# Patient Record
Sex: FEMALE
[De-identification: names, ages, dates, MRNs, and addresses within clinical notes are randomized per-mention and may not be internally consistent; named-entity substitution may affect disease eponyms.]

---

## 2020-06-11 ENCOUNTER — DASHBOARD ENCOUNTERS (OUTPATIENT)
Age: 35
End: 2020-06-11

## 2020-06-11 ENCOUNTER — OFFICE VISIT (OUTPATIENT)
Dept: URBAN - METROPOLITAN AREA CLINIC 105 | Facility: CLINIC | Age: 35
End: 2020-06-11
Payer: COMMERCIAL

## 2020-06-11 DIAGNOSIS — K80.20 ASYMPTOMATIC CHOLELITHIASIS: ICD-10-CM

## 2020-06-11 DIAGNOSIS — K58.1 IRRITABLE BOWEL SYNDROME WITH CONSTIPATION: ICD-10-CM

## 2020-06-11 DIAGNOSIS — K21.0 GERD WITH ESOPHAGITIS: ICD-10-CM

## 2020-06-11 DIAGNOSIS — E66.01 MORBID (SEVERE) OBESITY DUE TO EXCESS CALORIES: ICD-10-CM

## 2020-06-11 DIAGNOSIS — A04.8 H. PYLORI INFECTION: ICD-10-CM

## 2020-06-11 PROBLEM — 266474003: Status: ACTIVE | Noted: 2020-06-11

## 2020-06-11 PROBLEM — 440630006: Status: ACTIVE | Noted: 2020-06-11

## 2020-06-11 PROBLEM — 721730009: Status: ACTIVE | Noted: 2020-06-11

## 2020-06-11 PROBLEM — 238136002: Status: ACTIVE | Noted: 2020-06-11

## 2020-06-11 PROCEDURE — 99214 OFFICE O/P EST MOD 30 MIN: CPT | Performed by: INTERNAL MEDICINE

## 2020-06-11 RX ORDER — DICYCLOMINE HYDROCHLORIDE 10 MG/1
TAKE 1 CAPSULE BY ORAL ROUTE 3 TIMES A DAY (BEFORE MEALS) FOR 90 DAYS CAPSULE ORAL
Qty: 270 | Refills: 3 | Status: ACTIVE | COMMUNITY
Start: 2020-05-11 | End: 2021-05-06

## 2020-06-11 RX ORDER — OMEPRAZOLE 40 MG/1
TAKE 1 CAPSULE BY ORAL ROUTE ONCE A DAY (IN THE MORNING) FOR 90 DAYS CAPSULE, DELAYED RELEASE PELLETS ORAL 1
Qty: 90 | Refills: 3 | Status: ACTIVE | COMMUNITY
Start: 2020-05-11 | End: 2021-05-06

## 2020-06-11 RX ORDER — CHLORHEXIDINE GLUCONATE 4 %
TAKE 1 TABLET (325 MG) BY ORAL ROUTE ONCE DAILY LIQUID (ML) TOPICAL 1
Qty: 0 | Refills: 0 | Status: ACTIVE | COMMUNITY
Start: 1900-01-01

## 2020-06-11 NOTE — PHYSICAL EXAM GASTROINTESTINAL
Abdomen , soft, nontender,, no rebound pain noted. nondistended , no guarding or rigidity , no masses palpable , normal bowel sounds , Liver and Spleen , no hepatomegaly present , no hepatosplenomegaly , liver nontender , spleen not palpable

## 2020-06-11 NOTE — HPI-OTHER HISTORIES
Pt with history of GERD and IBS presents for a f/u office visit. Of note, the pt states that she is feeling well as her bx's revealed h. pylori infection. She has completed 10 days of antibiotic therapy. She was found to have gallstones on abd u/s but she denies abdominal pain, n/v.

## 2020-06-27 ENCOUNTER — TELEPHONE ENCOUNTER (OUTPATIENT)
Dept: URBAN - METROPOLITAN AREA CLINIC 92 | Facility: CLINIC | Age: 35
End: 2020-06-27